# Patient Record
Sex: FEMALE | Race: WHITE | Employment: PART TIME | URBAN - METROPOLITAN AREA
[De-identification: names, ages, dates, MRNs, and addresses within clinical notes are randomized per-mention and may not be internally consistent; named-entity substitution may affect disease eponyms.]

---

## 2019-09-25 ENCOUNTER — APPOINTMENT (OUTPATIENT)
Dept: ULTRASOUND IMAGING | Facility: HOSPITAL | Age: 24
End: 2019-09-25
Attending: NURSE PRACTITIONER
Payer: COMMERCIAL

## 2019-09-25 ENCOUNTER — HOSPITAL ENCOUNTER (EMERGENCY)
Facility: HOSPITAL | Age: 24
Discharge: HOME | End: 2019-09-25
Payer: COMMERCIAL

## 2019-09-25 VITALS
TEMPERATURE: 98 F | WEIGHT: 293 LBS | BODY MASS INDEX: 41.02 KG/M2 | RESPIRATION RATE: 18 BRPM | DIASTOLIC BLOOD PRESSURE: 88 MMHG | HEART RATE: 73 BPM | OXYGEN SATURATION: 100 % | SYSTOLIC BLOOD PRESSURE: 130 MMHG | HEIGHT: 71 IN

## 2019-09-25 DIAGNOSIS — R10.11 RUQ PAIN: Primary | ICD-10-CM

## 2019-09-25 LAB
ALBUMIN SERPL-MCNC: 3.7 G/DL (ref 3.4–5)
ALBUMIN/GLOB SERPL: 0.8 {RATIO} (ref 1–2)
ALP LIVER SERPL-CCNC: 62 U/L (ref 37–98)
ALT SERPL-CCNC: 32 U/L (ref 13–56)
ANION GAP SERPL CALC-SCNC: 8 MMOL/L (ref 0–18)
AST SERPL-CCNC: 22 U/L (ref 15–37)
B-HCG UR QL: NEGATIVE
BASOPHILS # BLD AUTO: 0.02 X10(3) UL (ref 0–0.2)
BASOPHILS NFR BLD AUTO: 0.2 %
BILIRUB SERPL-MCNC: 0.3 MG/DL (ref 0.1–2)
BILIRUB UR QL: NEGATIVE
BUN BLD-MCNC: 12 MG/DL (ref 7–18)
BUN/CREAT SERPL: 12.9 (ref 10–20)
CALCIUM BLD-MCNC: 9.3 MG/DL (ref 8.5–10.1)
CHLORIDE SERPL-SCNC: 107 MMOL/L (ref 98–112)
CLARITY UR: CLEAR
CO2 SERPL-SCNC: 22 MMOL/L (ref 21–32)
COLOR UR: YELLOW
CREAT BLD-MCNC: 0.93 MG/DL (ref 0.55–1.02)
DEPRECATED RDW RBC AUTO: 40.8 FL (ref 35.1–46.3)
EOSINOPHIL # BLD AUTO: 0.13 X10(3) UL (ref 0–0.7)
EOSINOPHIL NFR BLD AUTO: 1.4 %
ERYTHROCYTE [DISTWIDTH] IN BLOOD BY AUTOMATED COUNT: 13.2 % (ref 11–15)
GLOBULIN PLAS-MCNC: 4.5 G/DL (ref 2.8–4.4)
GLUCOSE BLD-MCNC: 117 MG/DL (ref 70–99)
GLUCOSE UR-MCNC: NEGATIVE MG/DL
HCT VFR BLD AUTO: 40.4 % (ref 35–48)
HGB BLD-MCNC: 13.2 G/DL (ref 12–16)
IMM GRANULOCYTES # BLD AUTO: 0.03 X10(3) UL (ref 0–1)
IMM GRANULOCYTES NFR BLD: 0.3 %
KETONES UR-MCNC: NEGATIVE MG/DL
LIPASE SERPL-CCNC: 112 U/L (ref 73–393)
LYMPHOCYTES # BLD AUTO: 2.62 X10(3) UL (ref 1–4)
LYMPHOCYTES NFR BLD AUTO: 27.7 %
M PROTEIN MFR SERPL ELPH: 8.2 G/DL (ref 6.4–8.2)
MCH RBC QN AUTO: 27.7 PG (ref 26–34)
MCHC RBC AUTO-ENTMCNC: 32.7 G/DL (ref 31–37)
MCV RBC AUTO: 84.9 FL (ref 80–100)
MONOCYTES # BLD AUTO: 0.49 X10(3) UL (ref 0.1–1)
MONOCYTES NFR BLD AUTO: 5.2 %
NEUTROPHILS # BLD AUTO: 6.17 X10 (3) UL (ref 1.5–7.7)
NEUTROPHILS # BLD AUTO: 6.17 X10(3) UL (ref 1.5–7.7)
NEUTROPHILS NFR BLD AUTO: 65.2 %
NITRITE UR QL STRIP.AUTO: NEGATIVE
OSMOLALITY SERPL CALC.SUM OF ELEC: 285 MOSM/KG (ref 275–295)
PH UR: 6 [PH] (ref 5–8)
PLATELET # BLD AUTO: 340 10(3)UL (ref 150–450)
POTASSIUM SERPL-SCNC: 4.1 MMOL/L (ref 3.5–5.1)
PROT UR-MCNC: NEGATIVE MG/DL
RBC # BLD AUTO: 4.76 X10(6)UL (ref 3.8–5.3)
RBC #/AREA URNS AUTO: 3 /HPF
SODIUM SERPL-SCNC: 137 MMOL/L (ref 136–145)
SP GR UR STRIP: 1 (ref 1–1.03)
UROBILINOGEN UR STRIP-ACNC: <2
WBC # BLD AUTO: 9.5 X10(3) UL (ref 4–11)
WBC #/AREA URNS AUTO: 5 /HPF

## 2019-09-25 PROCEDURE — 83690 ASSAY OF LIPASE: CPT

## 2019-09-25 PROCEDURE — 36415 COLL VENOUS BLD VENIPUNCTURE: CPT

## 2019-09-25 PROCEDURE — 85025 COMPLETE CBC W/AUTO DIFF WBC: CPT

## 2019-09-25 PROCEDURE — 81001 URINALYSIS AUTO W/SCOPE: CPT

## 2019-09-25 PROCEDURE — 81025 URINE PREGNANCY TEST: CPT

## 2019-09-25 PROCEDURE — 80053 COMPREHEN METABOLIC PANEL: CPT

## 2019-09-25 PROCEDURE — 99284 EMERGENCY DEPT VISIT MOD MDM: CPT

## 2019-09-25 PROCEDURE — 76705 ECHO EXAM OF ABDOMEN: CPT | Performed by: NURSE PRACTITIONER

## 2019-09-25 RX ORDER — FAMOTIDINE 20 MG/1
20 TABLET ORAL 2 TIMES DAILY PRN
Qty: 30 TABLET | Refills: 0 | Status: SHIPPED | OUTPATIENT
Start: 2019-09-25 | End: 2019-10-25

## 2019-09-25 RX ORDER — FAMOTIDINE 20 MG/1
20 TABLET ORAL ONCE
Status: COMPLETED | OUTPATIENT
Start: 2019-09-25 | End: 2019-09-25

## 2019-09-25 NOTE — ED INITIAL ASSESSMENT (HPI)
RUQ pain x few days. Pain worse after eating and states she has been vomiting when she eats. Denies diarrhea.

## 2019-09-26 NOTE — ED NOTES
Patient given discharge instructions and prescriptions sent to pharmacy. Patient verbalized understanding. Ambulated out of ED with steady gait.

## 2019-09-26 NOTE — ED PROVIDER NOTES
Patient Seen in: [facility] AND [facility] Emergency Department      History   Patient presents with:  Abdomen/Flank Pain (GI/)    Stated Complaint: abd/back pain w vomiting    23yo/f with no chronic medical problems reports to the ED with complaints of RUQ pa pulses. Pulmonary/Chest: Effort normal and breath sounds normal.   Abdominal: Soft. Bowel sounds are normal. There is tenderness in the right upper quadrant. Musculoskeletal: Normal range of motion. She exhibits no tenderness or deformity.    Neurologic sign was not elicited. BILE DUCTS:   Normal.  Common bile duct measures 2 mm.    OTHER:   Diffusely increased echogenicity throughout the liver consistent with steatosis.                 Impression     CONCLUSION:   1. Normal gallbladder ultrasound.   2.

## 2019-09-26 NOTE — ED NOTES
Patient complaining of R upper abdominal pain. Patient has been having multiple episodes of vomiting.

## 2022-01-08 ENCOUNTER — TELEPHONE (OUTPATIENT)
Dept: OBGYN CLINIC | Facility: CLINIC | Age: 27
End: 2022-01-08

## 2022-01-13 ENCOUNTER — OFFICE VISIT (OUTPATIENT)
Dept: INTERNAL MEDICINE CLINIC | Facility: CLINIC | Age: 27
End: 2022-01-13
Payer: COMMERCIAL

## 2022-01-13 VITALS
HEART RATE: 80 BPM | RESPIRATION RATE: 16 BRPM | HEIGHT: 70.5 IN | WEIGHT: 293 LBS | DIASTOLIC BLOOD PRESSURE: 80 MMHG | BODY MASS INDEX: 41.48 KG/M2 | SYSTOLIC BLOOD PRESSURE: 130 MMHG

## 2022-01-13 DIAGNOSIS — Z51.81 ENCOUNTER FOR THERAPEUTIC DRUG MONITORING: Primary | ICD-10-CM

## 2022-01-13 DIAGNOSIS — E66.01 MORBID OBESITY WITH BMI OF 50.0-59.9, ADULT (HCC): ICD-10-CM

## 2022-01-13 DIAGNOSIS — Z13.21 ENCOUNTER FOR VITAMIN DEFICIENCY SCREENING: ICD-10-CM

## 2022-01-13 DIAGNOSIS — Z91.89 CARDIOVASCULAR RISK FACTOR: ICD-10-CM

## 2022-01-13 PROCEDURE — 3079F DIAST BP 80-89 MM HG: CPT | Performed by: NURSE PRACTITIONER

## 2022-01-13 PROCEDURE — 99204 OFFICE O/P NEW MOD 45 MIN: CPT | Performed by: NURSE PRACTITIONER

## 2022-01-13 PROCEDURE — 3008F BODY MASS INDEX DOCD: CPT | Performed by: NURSE PRACTITIONER

## 2022-01-13 PROCEDURE — 3075F SYST BP GE 130 - 139MM HG: CPT | Performed by: NURSE PRACTITIONER

## 2022-01-13 RX ORDER — ETONOGESTREL 68 MG/1
IMPLANT SUBCUTANEOUS
COMMUNITY

## 2022-01-13 RX ORDER — SEMAGLUTIDE 0.5 MG/.5ML
0.5 INJECTION, SOLUTION SUBCUTANEOUS WEEKLY
Qty: 2 ML | Refills: 0 | Status: SHIPPED | OUTPATIENT
Start: 2022-01-13

## 2022-01-13 NOTE — PATIENT INSTRUCTIONS
Welcome to the [facility]...your Lifestyle Renovation begins now! Thank you for placing your trust in our health care team, I look forward to working with you along this journey to better health!     Next steps:     1.  Sched Capsules can be left out for 2 weeks, but then must be refrigerated.      Please download kelsey MyFitness Pal or Jonel Hilliard! to monitor daily dietary intake and you will be able to see if you are eating the right amount of calories or too much or too little which

## 2022-01-13 NOTE — PROGRESS NOTES
HISTORY OF PRESENT ILLNESS  Patient presents with:  Weight Problem: ref PCP, wants surgery      Gemma Osei is a 27year old female new to our office today for initiation of medical weight loss program. Patient presents today with c/o excess weight. obesity in parent/s or sibling: yes    REVIEW OF SYSTEMS  GENERAL: feels well otherwise  SKIN: denies any rashes to skin folds  HEENT: snoring- yes  LUNGS: denies shortness of breath with exertion, no apnea  CARDIOVASCULAR: denies chest pain on exertion, d 09/25/2019     09/25/2019    K 4.1 09/25/2019     09/25/2019    CO2 22.0 09/25/2019     No results found for: EAG, A1C  No results found for: CHOLEST, TRIG, HDL, LDL, VLDL, TCHDLRATIO, NONHDLC, CHOLHDLRATIO, NONHDLC, CALCNONHDL  No results foun PANEL; Future  -     VITAMIN D; Future  -     VITAMIN B1 (THIAMINE), BLOOD; Future  -     TSH+FREE T4; Future  -     PHOSPHORUS; Future  -     MAGNESIUM;  Future  -     OP REFERRAL TO DIETITIAN [facility] ([facility] USE ONLY)  -     semaglutide-weight management (WE of our registered dieticians. Bring along your food journal (3 days minimum). See journal options below. 2.  Complete fasting (10-12 hours, water only) labs at [facility] lab site prior to next office visit.   3.  Fill your prescribed medication and take as [employer] will help to see your daily carbohydrate intake. When you set the kelsey up chose 1-2 lbs/week as a goal.  Keeping a paper food journal is an option as well to remain accountable for your choices- this is the start to mindful eating!     Continue or start exer

## 2022-01-15 ENCOUNTER — TELEPHONE (OUTPATIENT)
Dept: OBGYN CLINIC | Facility: CLINIC | Age: 27
End: 2022-01-15

## 2022-01-15 NOTE — TELEPHONE ENCOUNTER
New patient scheduled an appointment via [facility] for 2/21 requesting to have her Nexplanon removed. Please advise.

## 2022-01-29 ENCOUNTER — LAB ENCOUNTER (OUTPATIENT)
Dept: LAB | Facility: HOSPITAL | Age: 27
End: 2022-01-29
Attending: NURSE PRACTITIONER
Payer: COMMERCIAL

## 2022-01-29 DIAGNOSIS — Z13.21 ENCOUNTER FOR VITAMIN DEFICIENCY SCREENING: ICD-10-CM

## 2022-01-29 DIAGNOSIS — E66.01 MORBID OBESITY WITH BMI OF 50.0-59.9, ADULT (HCC): ICD-10-CM

## 2022-01-29 DIAGNOSIS — Z51.81 ENCOUNTER FOR THERAPEUTIC DRUG MONITORING: ICD-10-CM

## 2022-01-29 DIAGNOSIS — Z91.89 CARDIOVASCULAR RISK FACTOR: ICD-10-CM

## 2022-01-29 LAB
ALBUMIN SERPL-MCNC: 3.9 G/DL (ref 3.4–5)
ALBUMIN/GLOB SERPL: 1 {RATIO} (ref 1–2)
ALP LIVER SERPL-CCNC: 56 U/L
ALT SERPL-CCNC: 30 U/L
ANION GAP SERPL CALC-SCNC: 8 MMOL/L (ref 0–18)
AST SERPL-CCNC: 17 U/L (ref 15–37)
BASOPHILS # BLD AUTO: 0.04 X10(3) UL (ref 0–0.2)
BASOPHILS NFR BLD AUTO: 0.6 %
BILIRUB SERPL-MCNC: 0.4 MG/DL (ref 0.1–2)
BUN BLD-MCNC: 15 MG/DL (ref 7–18)
BUN/CREAT SERPL: 19.7 (ref 10–20)
CALCIUM BLD-MCNC: 9.1 MG/DL (ref 8.5–10.1)
CHLORIDE SERPL-SCNC: 104 MMOL/L (ref 98–112)
CHOLEST SERPL-MCNC: 236 MG/DL (ref ?–200)
CO2 SERPL-SCNC: 23 MMOL/L (ref 21–32)
CREAT BLD-MCNC: 0.76 MG/DL
DEPRECATED HBV CORE AB SER IA-ACNC: 83.4 NG/ML
DEPRECATED RDW RBC AUTO: 40.6 FL (ref 35.1–46.3)
EOSINOPHIL # BLD AUTO: 0.11 X10(3) UL (ref 0–0.7)
EOSINOPHIL NFR BLD AUTO: 1.5 %
ERYTHROCYTE [DISTWIDTH] IN BLOOD BY AUTOMATED COUNT: 13.1 % (ref 11–15)
EST. AVERAGE GLUCOSE BLD GHB EST-MCNC: 117 MG/DL (ref 68–126)
FASTING PATIENT LIPID ANSWER: YES
FASTING STATUS PATIENT QL REPORTED: YES
GLOBULIN PLAS-MCNC: 3.8 G/DL (ref 2.8–4.4)
GLUCOSE BLD-MCNC: 82 MG/DL (ref 70–99)
HBA1C MFR BLD: 5.7 % (ref ?–5.7)
HCT VFR BLD AUTO: 39.8 %
HDLC SERPL-MCNC: 36 MG/DL (ref 40–59)
HGB BLD-MCNC: 12.9 G/DL
IMM GRANULOCYTES NFR BLD: 0.3 %
IRON SATN MFR SERPL: 14 %
IRON SERPL-MCNC: 52 UG/DL
LDLC SERPL CALC-MCNC: 162 MG/DL (ref ?–100)
LYMPHOCYTES # BLD AUTO: 1.89 X10(3) UL (ref 1–4)
LYMPHOCYTES NFR BLD AUTO: 26.3 %
MAGNESIUM SERPL-MCNC: 1.9 MG/DL (ref 1.6–2.6)
MCH RBC QN AUTO: 27.4 PG (ref 26–34)
MCHC RBC AUTO-ENTMCNC: 32.4 G/DL (ref 31–37)
MCV RBC AUTO: 84.5 FL
MONOCYTES # BLD AUTO: 0.34 X10(3) UL (ref 0.1–1)
MONOCYTES NFR BLD AUTO: 4.7 %
NEUTROPHILS # BLD AUTO: 4.78 X10 (3) UL (ref 1.5–7.7)
NEUTROPHILS # BLD AUTO: 4.78 X10(3) UL (ref 1.5–7.7)
NEUTROPHILS NFR BLD AUTO: 66.6 %
NONHDLC SERPL-MCNC: 200 MG/DL (ref ?–130)
OSMOLALITY SERPL CALC.SUM OF ELEC: 280 MOSM/KG (ref 275–295)
PHOSPHATE SERPL-MCNC: 2.8 MG/DL (ref 2.5–4.9)
PLATELET # BLD AUTO: 295 10(3)UL (ref 150–450)
POTASSIUM SERPL-SCNC: 4.4 MMOL/L (ref 3.5–5.1)
PROT SERPL-MCNC: 7.7 G/DL (ref 6.4–8.2)
RBC # BLD AUTO: 4.71 X10(6)UL
SODIUM SERPL-SCNC: 135 MMOL/L (ref 136–145)
TIBC SERPL-MCNC: 365 UG/DL (ref 240–450)
TRANSFERRIN SERPL-MCNC: 245 MG/DL (ref 200–360)
TSI SER-ACNC: 1.98 MIU/ML (ref 0.36–3.74)
VIT B12 SERPL-MCNC: 641 PG/ML (ref 193–986)
VIT D+METAB SERPL-MCNC: 17.4 NG/ML (ref 30–100)
VLDLC SERPL CALC-MCNC: 40 MG/DL (ref 0–30)
WBC # BLD AUTO: 7.2 X10(3) UL (ref 4–11)

## 2022-01-29 PROCEDURE — 84100 ASSAY OF PHOSPHORUS: CPT

## 2022-01-29 PROCEDURE — 84425 ASSAY OF VITAMIN B-1: CPT

## 2022-01-29 PROCEDURE — 83735 ASSAY OF MAGNESIUM: CPT

## 2022-01-29 PROCEDURE — 83540 ASSAY OF IRON: CPT

## 2022-01-29 PROCEDURE — 82607 VITAMIN B-12: CPT

## 2022-01-29 PROCEDURE — 83036 HEMOGLOBIN GLYCOSYLATED A1C: CPT

## 2022-01-29 PROCEDURE — 82746 ASSAY OF FOLIC ACID SERUM: CPT

## 2022-01-29 PROCEDURE — 82306 VITAMIN D 25 HYDROXY: CPT

## 2022-01-29 PROCEDURE — 84439 ASSAY OF FREE THYROXINE: CPT

## 2022-01-29 PROCEDURE — 36415 COLL VENOUS BLD VENIPUNCTURE: CPT

## 2022-01-29 PROCEDURE — 85025 COMPLETE CBC W/AUTO DIFF WBC: CPT

## 2022-01-29 PROCEDURE — 84466 ASSAY OF TRANSFERRIN: CPT

## 2022-01-29 PROCEDURE — 80061 LIPID PANEL: CPT

## 2022-01-29 PROCEDURE — 84443 ASSAY THYROID STIM HORMONE: CPT

## 2022-01-29 PROCEDURE — 80053 COMPREHEN METABOLIC PANEL: CPT

## 2022-01-29 PROCEDURE — 82728 ASSAY OF FERRITIN: CPT

## 2022-01-31 ENCOUNTER — OFFICE VISIT (OUTPATIENT)
Dept: INTERNAL MEDICINE CLINIC | Facility: CLINIC | Age: 27
End: 2022-01-31
Payer: COMMERCIAL

## 2022-01-31 ENCOUNTER — MED REC SCAN ONLY (OUTPATIENT)
Dept: INTERNAL MEDICINE CLINIC | Facility: CLINIC | Age: 27
End: 2022-01-31

## 2022-01-31 VITALS
WEIGHT: 293 LBS | OXYGEN SATURATION: 97 % | BODY MASS INDEX: 41.48 KG/M2 | HEART RATE: 78 BPM | DIASTOLIC BLOOD PRESSURE: 70 MMHG | HEIGHT: 70.5 IN | SYSTOLIC BLOOD PRESSURE: 128 MMHG

## 2022-01-31 DIAGNOSIS — R73.02 IMPAIRED GLUCOSE TOLERANCE: ICD-10-CM

## 2022-01-31 DIAGNOSIS — E78.5 HYPERLIPIDEMIA, UNSPECIFIED HYPERLIPIDEMIA TYPE: ICD-10-CM

## 2022-01-31 DIAGNOSIS — E66.01 MORBID OBESITY WITH BMI OF 50.0-59.9, ADULT (HCC): Primary | ICD-10-CM

## 2022-01-31 PROCEDURE — 3008F BODY MASS INDEX DOCD: CPT | Performed by: FAMILY MEDICINE

## 2022-01-31 PROCEDURE — 99203 OFFICE O/P NEW LOW 30 MIN: CPT | Performed by: FAMILY MEDICINE

## 2022-01-31 PROCEDURE — 3078F DIAST BP <80 MM HG: CPT | Performed by: FAMILY MEDICINE

## 2022-01-31 PROCEDURE — 3074F SYST BP LT 130 MM HG: CPT | Performed by: FAMILY MEDICINE

## 2022-02-03 LAB — VITAMIN B1 (THIAMINE), WHOLE B: 126 NMOL/L

## 2022-07-28 ENCOUNTER — OFFICE VISIT (OUTPATIENT)
Dept: OBGYN CLINIC | Facility: CLINIC | Age: 27
End: 2022-07-28
Payer: COMMERCIAL

## 2022-07-28 VITALS
WEIGHT: 293 LBS | DIASTOLIC BLOOD PRESSURE: 82 MMHG | BODY MASS INDEX: 41.48 KG/M2 | SYSTOLIC BLOOD PRESSURE: 128 MMHG | HEIGHT: 70.5 IN

## 2022-07-28 DIAGNOSIS — Z01.419 ENCOUNTER FOR ANNUAL ROUTINE GYNECOLOGICAL EXAMINATION: Primary | ICD-10-CM

## 2022-07-28 DIAGNOSIS — Z30.46 ENCOUNTER FOR SURVEILLANCE OF IMPLANTABLE SUBDERMAL CONTRACEPTIVE: ICD-10-CM

## 2022-07-28 PROCEDURE — 99385 PREV VISIT NEW AGE 18-39: CPT | Performed by: OBSTETRICS & GYNECOLOGY

## 2022-07-28 PROCEDURE — 88175 CYTOPATH C/V AUTO FLUID REDO: CPT | Performed by: OBSTETRICS & GYNECOLOGY

## 2022-07-28 PROCEDURE — 3074F SYST BP LT 130 MM HG: CPT | Performed by: OBSTETRICS & GYNECOLOGY

## 2022-07-28 PROCEDURE — 3008F BODY MASS INDEX DOCD: CPT | Performed by: OBSTETRICS & GYNECOLOGY

## 2022-07-28 PROCEDURE — 3079F DIAST BP 80-89 MM HG: CPT | Performed by: OBSTETRICS & GYNECOLOGY

## 2022-08-04 LAB — PAP HISTORY (OTHER THAN LAST PAP): NORMAL

## 2022-08-09 ENCOUNTER — OFFICE VISIT (OUTPATIENT)
Dept: OBGYN CLINIC | Facility: CLINIC | Age: 27
End: 2022-08-09
Payer: COMMERCIAL

## 2022-08-09 VITALS
WEIGHT: 293 LBS | BODY MASS INDEX: 41.48 KG/M2 | DIASTOLIC BLOOD PRESSURE: 76 MMHG | HEIGHT: 70.5 IN | SYSTOLIC BLOOD PRESSURE: 128 MMHG

## 2022-08-09 DIAGNOSIS — Z30.017 INSERTION OF IMPLANTABLE SUBDERMAL CONTRACEPTIVE: ICD-10-CM

## 2022-08-09 DIAGNOSIS — Z30.46 ENCOUNTER FOR REMOVAL AND REINSERTION OF NEXPLANON: Primary | ICD-10-CM

## 2022-08-09 LAB
CONTROL LINE PRESENT WITH A CLEAR BACKGROUND (YES/NO): YES YES/NO
PREGNANCY TEST, URINE: NEGATIVE

## 2022-08-09 PROCEDURE — 3078F DIAST BP <80 MM HG: CPT | Performed by: OBSTETRICS & GYNECOLOGY

## 2022-08-09 PROCEDURE — 81025 URINE PREGNANCY TEST: CPT | Performed by: OBSTETRICS & GYNECOLOGY

## 2022-08-09 PROCEDURE — 11983 REMOVE/INSERT DRUG IMPLANT: CPT | Performed by: OBSTETRICS & GYNECOLOGY

## 2022-08-09 PROCEDURE — 3008F BODY MASS INDEX DOCD: CPT | Performed by: OBSTETRICS & GYNECOLOGY

## 2022-08-09 PROCEDURE — 3074F SYST BP LT 130 MM HG: CPT | Performed by: OBSTETRICS & GYNECOLOGY

## 2022-08-09 NOTE — PROCEDURES
Nexplanon Removal Procedure  [facility]    REASON FOR PROCEDURE: The patient IS  and presents for removal of Nexplanon. I reviewed the risks and procedureof Nexplanon removal including bleeding, injury, infection and difficult removal.   A procedure and Time Out were performed. A urine pregnancy test was performed in the office and is negative. The patient was prepped and draped in a sterile manner while in the supine position . The left arm was flexed at the elbow and externally rotated, prepped with betadine over the area of the palpated Nexplanon and injected with 1 cc of 1% lidocaine. A sterile drape was placed over the exposed area and sterile gloves were applied. A 3mm incision was made near the scar from the insertion site. The Nexplanon was then brought to the incision and removed with forceps. The patient was shown the removed Nexplanon. In summary, this patient underwent a Nexplanon removal today. She was instructed on the risks of the procedure and the warning signs for infection. Julia Rodriguez MD      NEXPLANON PROCEDURE NOTE  [place] [address] [facility]    Urine Pregnancy Test:  neg. REASON FOR PROCEDURE:  Patient is a  who is here for Nexplanon insertion. The patient was informed regarding indication for procedure, technique, and the risk of procedure to include bleeding and infection. INFORMED CONSENT & TIME OUT:  As documented in the Time Out and Pre-Procedure Check Lists. PROCEDURE:  The patient was placed in a supine position with the left arm flexed at the elbow and externally rotated. The arm was marked at the insertion site overlying the triceps muscle 8-10 cm from the medial epicondyle of the humerus. The insertion site was cleaned with an antiseptic. 2 mL of 1% lidocaine was injected under the skin along the planned insertion tunnel.  The Nexplanon applicator was grasped, the protection cap was removed from the applicator, and the white Nexplanon device was visualized within the applicator. The applicator needle was inserted subdermally at the removal site in the standard fashion, and the device was deployed. Placement was confirmed by palpation. The insertion site was dressed with adhesive bandage and a pressure bandage. INSTRUCTIONS GIVEN TO THE PATIENT:  Patient was told to return to the office for fever, bleeding, and pain. Patient was instructed to return for lane removal in three to five years. DISPOSITION:  Follow-up as needed.     PRIMARY PROCEDURALIST:   Estee Hernandez MD

## 2023-06-06 ENCOUNTER — PATIENT MESSAGE (OUTPATIENT)
Dept: ORTHOPEDICS CLINIC | Facility: CLINIC | Age: 28
End: 2023-06-06

## 2023-06-06 NOTE — TELEPHONE ENCOUNTER
From: Karoline Tran  To: Caitlin Paul  Sent: 6/6/2023 3:15 PM CDT  Subject: knee x-ray    Hello,     I see an order for an xray- it shows right knee- my left knee if the one that is hurting- do we need to update the order or is this an order for both knee's?

## 2023-06-07 ENCOUNTER — HOSPITAL ENCOUNTER (OUTPATIENT)
Dept: GENERAL RADIOLOGY | Facility: HOSPITAL | Age: 28
Discharge: HOME | End: 2023-06-07
Attending: PHYSICIAN ASSISTANT
Payer: COMMERCIAL

## 2023-06-07 DIAGNOSIS — Z01.89 ENCOUNTER FOR LOWER EXTREMITY COMPARISON IMAGING STUDY: ICD-10-CM

## 2023-06-07 DIAGNOSIS — M25.562 LEFT KNEE PAIN, UNSPECIFIED CHRONICITY: ICD-10-CM

## 2023-06-07 PROCEDURE — 73564 X-RAY EXAM KNEE 4 OR MORE: CPT | Performed by: PHYSICIAN ASSISTANT

## 2023-06-07 NOTE — TELEPHONE ENCOUNTER
Pt had left knee xray completed today for upcoming appt. Skipped the right comparison knee views as she was given a billing estimate of $742 despite comparison view estimate. Let us know if would like pt to return to radiology to complete comparison views. Will respond to pt. Thank you!

## 2023-09-04 ENCOUNTER — APPOINTMENT (OUTPATIENT)
Dept: CT IMAGING | Facility: HOSPITAL | Age: 28
End: 2023-09-04
Attending: NURSE PRACTITIONER
Payer: COMMERCIAL

## 2023-09-04 ENCOUNTER — HOSPITAL ENCOUNTER (EMERGENCY)
Facility: HOSPITAL | Age: 28
Discharge: HOME | End: 2023-09-04
Payer: COMMERCIAL

## 2023-09-04 VITALS
DIASTOLIC BLOOD PRESSURE: 69 MMHG | SYSTOLIC BLOOD PRESSURE: 121 MMHG | BODY MASS INDEX: 41.02 KG/M2 | WEIGHT: 293 LBS | TEMPERATURE: 98 F | HEART RATE: 61 BPM | OXYGEN SATURATION: 96 % | HEIGHT: 71 IN | RESPIRATION RATE: 18 BRPM

## 2023-09-04 DIAGNOSIS — N20.1 CALCULUS OF URETEROVESICAL JUNCTION (UVJ): Primary | ICD-10-CM

## 2023-09-04 LAB
ANION GAP SERPL CALC-SCNC: 3 MMOL/L (ref 0–18)
B-HCG UR QL: NEGATIVE
BASOPHILS # BLD AUTO: 0.03 X10(3) UL (ref 0–0.2)
BASOPHILS NFR BLD AUTO: 0.3 %
BILIRUB UR QL: NEGATIVE
BUN BLD-MCNC: 18 MG/DL (ref 7–18)
BUN/CREAT SERPL: 16.4 (ref 10–20)
CALCIUM BLD-MCNC: 9.7 MG/DL (ref 8.5–10.1)
CHLORIDE SERPL-SCNC: 110 MMOL/L (ref 98–112)
CO2 SERPL-SCNC: 26 MMOL/L (ref 21–32)
COLOR UR: YELLOW
CREAT BLD-MCNC: 1.1 MG/DL
DEPRECATED RDW RBC AUTO: 42.2 FL (ref 35.1–46.3)
EGFRCR SERPLBLD CKD-EPI 2021: 69 ML/MIN/1.73M2 (ref 60–?)
EOSINOPHIL # BLD AUTO: 0.09 X10(3) UL (ref 0–0.7)
EOSINOPHIL NFR BLD AUTO: 0.9 %
ERYTHROCYTE [DISTWIDTH] IN BLOOD BY AUTOMATED COUNT: 13.6 % (ref 11–15)
GLUCOSE BLD-MCNC: 98 MG/DL (ref 70–99)
GLUCOSE UR-MCNC: NORMAL MG/DL
HCT VFR BLD AUTO: 39.5 %
HGB BLD-MCNC: 13 G/DL
IMM GRANULOCYTES # BLD AUTO: 0.03 X10(3) UL (ref 0–1)
IMM GRANULOCYTES NFR BLD: 0.3 %
KETONES UR-MCNC: NEGATIVE MG/DL
LEUKOCYTE ESTERASE UR QL STRIP.AUTO: 250
LYMPHOCYTES # BLD AUTO: 1.79 X10(3) UL (ref 1–4)
LYMPHOCYTES NFR BLD AUTO: 18.2 %
MCH RBC QN AUTO: 27.7 PG (ref 26–34)
MCHC RBC AUTO-ENTMCNC: 32.9 G/DL (ref 31–37)
MCV RBC AUTO: 84 FL
MONOCYTES # BLD AUTO: 0.51 X10(3) UL (ref 0.1–1)
MONOCYTES NFR BLD AUTO: 5.2 %
NEUTROPHILS # BLD AUTO: 7.41 X10 (3) UL (ref 1.5–7.7)
NEUTROPHILS # BLD AUTO: 7.41 X10(3) UL (ref 1.5–7.7)
NEUTROPHILS NFR BLD AUTO: 75.1 %
NITRITE UR QL STRIP.AUTO: NEGATIVE
OSMOLALITY SERPL CALC.SUM OF ELEC: 290 MOSM/KG (ref 275–295)
PH UR: 5.5 [PH] (ref 5–8)
PLATELET # BLD AUTO: 295 10(3)UL (ref 150–450)
POTASSIUM SERPL-SCNC: 3.8 MMOL/L (ref 3.5–5.1)
PROT UR-MCNC: 30 MG/DL
RBC # BLD AUTO: 4.7 X10(6)UL
RBC #/AREA URNS AUTO: >10 /HPF
SODIUM SERPL-SCNC: 139 MMOL/L (ref 136–145)
SP GR UR STRIP: 1.03 (ref 1–1.03)
UROBILINOGEN UR STRIP-ACNC: NORMAL
WBC # BLD AUTO: 9.9 X10(3) UL (ref 4–11)

## 2023-09-04 PROCEDURE — 81025 URINE PREGNANCY TEST: CPT

## 2023-09-04 PROCEDURE — 96374 THER/PROPH/DIAG INJ IV PUSH: CPT

## 2023-09-04 PROCEDURE — 80048 BASIC METABOLIC PNL TOTAL CA: CPT

## 2023-09-04 PROCEDURE — 85025 COMPLETE CBC W/AUTO DIFF WBC: CPT

## 2023-09-04 PROCEDURE — 74176 CT ABD & PELVIS W/O CONTRAST: CPT | Performed by: NURSE PRACTITIONER

## 2023-09-04 PROCEDURE — 99284 EMERGENCY DEPT VISIT MOD MDM: CPT

## 2023-09-04 PROCEDURE — 96361 HYDRATE IV INFUSION ADD-ON: CPT

## 2023-09-04 PROCEDURE — 87086 URINE CULTURE/COLONY COUNT: CPT

## 2023-09-04 PROCEDURE — 81001 URINALYSIS AUTO W/SCOPE: CPT

## 2023-09-04 PROCEDURE — 96375 TX/PRO/DX INJ NEW DRUG ADDON: CPT

## 2023-09-04 RX ORDER — MORPHINE SULFATE 4 MG/ML
4 INJECTION, SOLUTION INTRAMUSCULAR; INTRAVENOUS ONCE
Status: COMPLETED | OUTPATIENT
Start: 2023-09-04 | End: 2023-09-04

## 2023-09-04 RX ORDER — TRAMADOL HYDROCHLORIDE 50 MG/1
TABLET ORAL EVERY 6 HOURS PRN
Qty: 10 TABLET | Refills: 0 | Status: SHIPPED | OUTPATIENT
Start: 2023-09-04 | End: 2023-09-06

## 2023-09-04 RX ORDER — ONDANSETRON 2 MG/ML
4 INJECTION INTRAMUSCULAR; INTRAVENOUS ONCE
Status: COMPLETED | OUTPATIENT
Start: 2023-09-04 | End: 2023-09-04

## 2023-09-04 RX ORDER — KETOROLAC TROMETHAMINE 15 MG/ML
15 INJECTION, SOLUTION INTRAMUSCULAR; INTRAVENOUS ONCE
Status: COMPLETED | OUTPATIENT
Start: 2023-09-04 | End: 2023-09-04

## 2023-09-04 RX ORDER — TAMSULOSIN HYDROCHLORIDE 0.4 MG/1
0.4 CAPSULE ORAL DAILY
Qty: 7 CAPSULE | Refills: 0 | Status: ON HOLD | OUTPATIENT
Start: 2023-09-04 | End: 2023-09-06

## 2023-09-04 RX ORDER — CEPHALEXIN 500 MG/1
500 CAPSULE ORAL 2 TIMES DAILY
Qty: 14 CAPSULE | Refills: 0 | Status: SHIPPED | OUTPATIENT
Start: 2023-09-04 | End: 2023-09-11

## 2023-09-04 RX ORDER — TAMSULOSIN HYDROCHLORIDE 0.4 MG/1
0.4 CAPSULE ORAL ONCE
Status: COMPLETED | OUTPATIENT
Start: 2023-09-04 | End: 2023-09-04

## 2023-09-05 ENCOUNTER — HOSPITAL ENCOUNTER (OUTPATIENT)
Facility: HOSPITAL | Age: 28
Setting detail: OBSERVATION
Discharge: HOME | End: 2023-09-06
Attending: EMERGENCY MEDICINE | Admitting: HOSPITALIST
Payer: COMMERCIAL

## 2023-09-05 DIAGNOSIS — R52 INTRACTABLE PAIN: Primary | ICD-10-CM

## 2023-09-05 DIAGNOSIS — N20.0 KIDNEY STONE: ICD-10-CM

## 2023-09-05 PROCEDURE — 99203 OFFICE O/P NEW LOW 30 MIN: CPT | Performed by: UROLOGY

## 2023-09-05 PROCEDURE — 99203 OFFICE O/P NEW LOW 30 MIN: CPT | Performed by: NURSE PRACTITIONER

## 2023-09-05 RX ORDER — MORPHINE SULFATE 4 MG/ML
4 INJECTION, SOLUTION INTRAMUSCULAR; INTRAVENOUS EVERY 2 HOUR PRN
Status: DISCONTINUED | OUTPATIENT
Start: 2023-09-05 | End: 2023-09-06

## 2023-09-05 RX ORDER — HYDROCODONE BITARTRATE AND ACETAMINOPHEN 5; 325 MG/1; MG/1
1 TABLET ORAL EVERY 4 HOURS PRN
Status: DISCONTINUED | OUTPATIENT
Start: 2023-09-05 | End: 2023-09-06

## 2023-09-05 RX ORDER — BISACODYL 10 MG
10 SUPPOSITORY, RECTAL RECTAL
Status: DISCONTINUED | OUTPATIENT
Start: 2023-09-05 | End: 2023-09-06

## 2023-09-05 RX ORDER — PROCHLORPERAZINE EDISYLATE 5 MG/ML
5 INJECTION INTRAMUSCULAR; INTRAVENOUS EVERY 8 HOURS PRN
Status: DISCONTINUED | OUTPATIENT
Start: 2023-09-05 | End: 2023-09-06

## 2023-09-05 RX ORDER — HYDROCODONE BITARTRATE AND ACETAMINOPHEN 5; 325 MG/1; MG/1
2 TABLET ORAL EVERY 4 HOURS PRN
Status: DISCONTINUED | OUTPATIENT
Start: 2023-09-05 | End: 2023-09-06

## 2023-09-05 RX ORDER — POLYETHYLENE GLYCOL 3350 17 G/17G
17 POWDER, FOR SOLUTION ORAL DAILY PRN
Status: DISCONTINUED | OUTPATIENT
Start: 2023-09-05 | End: 2023-09-06

## 2023-09-05 RX ORDER — MORPHINE SULFATE 4 MG/ML
4 INJECTION, SOLUTION INTRAMUSCULAR; INTRAVENOUS ONCE
Status: COMPLETED | OUTPATIENT
Start: 2023-09-05 | End: 2023-09-05

## 2023-09-05 RX ORDER — POTASSIUM CHLORIDE 20 MEQ/1
40 TABLET, EXTENDED RELEASE ORAL ONCE
Status: COMPLETED | OUTPATIENT
Start: 2023-09-05 | End: 2023-09-05

## 2023-09-05 RX ORDER — MORPHINE SULFATE 2 MG/ML
2 INJECTION, SOLUTION INTRAMUSCULAR; INTRAVENOUS EVERY 2 HOUR PRN
Status: DISCONTINUED | OUTPATIENT
Start: 2023-09-05 | End: 2023-09-06

## 2023-09-05 RX ORDER — ACETAMINOPHEN 325 MG/1
650 TABLET ORAL EVERY 4 HOURS PRN
Status: DISCONTINUED | OUTPATIENT
Start: 2023-09-05 | End: 2023-09-06

## 2023-09-05 RX ORDER — ONDANSETRON 2 MG/ML
4 INJECTION INTRAMUSCULAR; INTRAVENOUS EVERY 6 HOURS PRN
Status: DISCONTINUED | OUTPATIENT
Start: 2023-09-05 | End: 2023-09-06

## 2023-09-05 RX ORDER — TIRZEPATIDE 5 MG/.5ML
5 INJECTION, SOLUTION SUBCUTANEOUS
COMMUNITY

## 2023-09-05 RX ORDER — MORPHINE SULFATE 2 MG/ML
1 INJECTION, SOLUTION INTRAMUSCULAR; INTRAVENOUS EVERY 2 HOUR PRN
Status: DISCONTINUED | OUTPATIENT
Start: 2023-09-05 | End: 2023-09-06

## 2023-09-05 RX ORDER — DEXTROSE AND SODIUM CHLORIDE 5; .9 G/100ML; G/100ML
INJECTION, SOLUTION INTRAVENOUS CONTINUOUS
Status: DISCONTINUED | OUTPATIENT
Start: 2023-09-05 | End: 2023-09-06

## 2023-09-05 RX ORDER — ONDANSETRON 2 MG/ML
4 INJECTION INTRAMUSCULAR; INTRAVENOUS ONCE
Status: COMPLETED | OUTPATIENT
Start: 2023-09-05 | End: 2023-09-05

## 2023-09-05 RX ORDER — ENEMA 19; 7 G/133ML; G/133ML
1 ENEMA RECTAL ONCE AS NEEDED
Status: DISCONTINUED | OUTPATIENT
Start: 2023-09-05 | End: 2023-09-06

## 2023-09-05 RX ORDER — TAMSULOSIN HYDROCHLORIDE 0.4 MG/1
0.4 CAPSULE ORAL
Status: DISCONTINUED | OUTPATIENT
Start: 2023-09-05 | End: 2023-09-06

## 2023-09-05 RX ORDER — MULTIVIT-MIN/IRON FUM/FOLIC AC 7.5 MG-4
1 TABLET ORAL DAILY
COMMUNITY

## 2023-09-05 RX ORDER — KETOROLAC TROMETHAMINE 15 MG/ML
15 INJECTION, SOLUTION INTRAMUSCULAR; INTRAVENOUS ONCE
Status: COMPLETED | OUTPATIENT
Start: 2023-09-05 | End: 2023-09-05

## 2023-09-05 RX ORDER — SENNOSIDES 8.6 MG
17.2 TABLET ORAL NIGHTLY PRN
Status: DISCONTINUED | OUTPATIENT
Start: 2023-09-05 | End: 2023-09-06

## 2023-09-05 NOTE — PLAN OF CARE
Problem: Patient Centered Care  Goal: Patient preferences are identified and integrated in the patient's plan of care  Description: Interventions:  - What would you like us to know as we care for you? Lives at home with family  - Provide timely, complete, and accurate information to patient/family  - Incorporate patient and family knowledge, values, beliefs, and cultural backgrounds into the planning and delivery of care  - Encourage patient/family to participate in care and decision-making at the level they choose  - Honor patient and family perspectives and choices  Note: Received from ER via wheelchair. Patient alert and oriented, denies any complaint upon arrival to [facility]. Patient assisted to bed, vital signs taken, oriented to room and call light. Vital signs stable. IVF started as ordered. Will continue to monitor. BEd on low and locked position, call light within reach.

## 2023-09-05 NOTE — ED INITIAL ASSESSMENT (HPI)
Pt from home to ED via triage for evaluation of kidney stone. Pt seen and treated in this ED yesterday evening for left sided flank pain, dx with kidney stone. Pt states pain initially subsided and then came back at 3am, waking pt from sleep.

## 2023-09-05 NOTE — ED INITIAL ASSESSMENT (HPI)
Patient reports L flank pain and difficulty urinating that began earlier today. Hx of kidney stones, states pain and symptoms feel similar. Denies fever.

## 2023-09-05 NOTE — ED QUICK NOTES
Orders for admission, patient is aware of plan and ready to go upstairs. Any questions, please call ED RN Lucy Mcintyre at [address].      Patient Covid vaccination status: Fully vaccinated     COVID Test Ordered in ED: None    COVID Suspicion at Admission: N/A    Running Infusions:      Mental Status/LOC at time of transport: alert and orientated x4    Other pertinent information:   CIWA score: N/A   NIH score:  N/A

## 2023-09-06 ENCOUNTER — TELEPHONE (OUTPATIENT)
Dept: SURGERY | Facility: CLINIC | Age: 28
End: 2023-09-06

## 2023-09-06 ENCOUNTER — APPOINTMENT (OUTPATIENT)
Dept: CT IMAGING | Facility: HOSPITAL | Age: 28
End: 2023-09-06
Attending: NURSE PRACTITIONER
Payer: COMMERCIAL

## 2023-09-06 VITALS
HEART RATE: 62 BPM | DIASTOLIC BLOOD PRESSURE: 65 MMHG | SYSTOLIC BLOOD PRESSURE: 124 MMHG | OXYGEN SATURATION: 97 % | TEMPERATURE: 98 F | WEIGHT: 293 LBS | BODY MASS INDEX: 41.02 KG/M2 | RESPIRATION RATE: 20 BRPM | HEIGHT: 71 IN

## 2023-09-06 LAB
ANION GAP SERPL CALC-SCNC: 3 MMOL/L (ref 0–18)
BASOPHILS # BLD AUTO: 0.02 X10(3) UL (ref 0–0.2)
BASOPHILS NFR BLD AUTO: 0.4 %
BUN BLD-MCNC: 12 MG/DL (ref 7–18)
BUN/CREAT SERPL: 12.4 (ref 10–20)
CALCIUM BLD-MCNC: 8.8 MG/DL (ref 8.5–10.1)
CHLORIDE SERPL-SCNC: 114 MMOL/L (ref 98–112)
CO2 SERPL-SCNC: 24 MMOL/L (ref 21–32)
CREAT BLD-MCNC: 0.97 MG/DL
DEPRECATED RDW RBC AUTO: 43 FL (ref 35.1–46.3)
EGFRCR SERPLBLD CKD-EPI 2021: 81 ML/MIN/1.73M2 (ref 60–?)
EOSINOPHIL # BLD AUTO: 0.1 X10(3) UL (ref 0–0.7)
EOSINOPHIL NFR BLD AUTO: 2.1 %
ERYTHROCYTE [DISTWIDTH] IN BLOOD BY AUTOMATED COUNT: 13.6 % (ref 11–15)
GLUCOSE BLD-MCNC: 90 MG/DL (ref 70–99)
HCT VFR BLD AUTO: 36.9 %
HGB BLD-MCNC: 11.7 G/DL
IMM GRANULOCYTES # BLD AUTO: 0 X10(3) UL (ref 0–1)
IMM GRANULOCYTES NFR BLD: 0 %
LYMPHOCYTES # BLD AUTO: 1.64 X10(3) UL (ref 1–4)
LYMPHOCYTES NFR BLD AUTO: 35.1 %
MCH RBC QN AUTO: 27.1 PG (ref 26–34)
MCHC RBC AUTO-ENTMCNC: 31.7 G/DL (ref 31–37)
MCV RBC AUTO: 85.6 FL
MONOCYTES # BLD AUTO: 0.32 X10(3) UL (ref 0.1–1)
MONOCYTES NFR BLD AUTO: 6.9 %
NEUTROPHILS # BLD AUTO: 2.59 X10 (3) UL (ref 1.5–7.7)
NEUTROPHILS # BLD AUTO: 2.59 X10(3) UL (ref 1.5–7.7)
NEUTROPHILS NFR BLD AUTO: 55.5 %
OSMOLALITY SERPL CALC.SUM OF ELEC: 291 MOSM/KG (ref 275–295)
PLATELET # BLD AUTO: 233 10(3)UL (ref 150–450)
POTASSIUM SERPL-SCNC: 4.2 MMOL/L (ref 3.5–5.1)
POTASSIUM SERPL-SCNC: 4.2 MMOL/L (ref 3.5–5.1)
RBC # BLD AUTO: 4.31 X10(6)UL
SODIUM SERPL-SCNC: 141 MMOL/L (ref 136–145)
WBC # BLD AUTO: 4.7 X10(3) UL (ref 4–11)

## 2023-09-06 PROCEDURE — 99212 OFFICE O/P EST SF 10 MIN: CPT | Performed by: UROLOGY

## 2023-09-06 RX ORDER — TAMSULOSIN HYDROCHLORIDE 0.4 MG/1
0.4 CAPSULE ORAL DAILY
Qty: 30 CAPSULE | Refills: 0 | Status: SHIPPED | OUTPATIENT
Start: 2023-09-06 | End: 2023-10-06

## 2023-09-06 NOTE — CM/SW NOTE
09/06/23 1200   Discharge disposition   Expected discharge disposition Home or Self   Discharge transportation Private car     Evans DANN RN Srikanth Philip RN Case Manager  [phone]

## 2023-09-06 NOTE — PROGRESS NOTES
Tmax 97.9, heart rate 62, blood pressure 124/65, respiratory rate 20, SPO2 97%    White blood cell count 4.7 from 9.9. Urine culture negative. Patient feels well. CONSTITUTIONAL: No apparent distress, cooperative and communicative  NEUROLOGIC: Alert and oriented   HEAD: Normocephalic, atraumatic   EYES: Sclera non-icteric   ENT: Hearing intact, moist mucous membranes   NECK: No obvious goiter or masses   RESPIRATORY: Normal respiratory effort, Nonlabored breathing on room air  SKIN: No evident rashes   ABDOMEN: Soft, nontender, nondistended, no rebound tenderness, no guarding, no masses     IMPRESSION: 2mm left UVJ stone without any signs or symptoms to warrant urgent intervention. Patient will pursue medical expulsive therapy. She will return to clinic in 4 weeks for stone analysis/possible ordering of CT scan if she has not passed the stone. She also has a 7 mm right interpolar stone that we can discuss treatment for. Plan:  1. Tamsulosin 0.4 mg nightly  2. Hydration  3. Return to clinic in 4 weeks for stone analysis/possible ordering of CT if she has not passed the stone and discussion of right ureteroscopy  4.  [id number] Wendy Haney for [employer] with tamsulosin

## 2023-09-06 NOTE — TELEPHONE ENCOUNTER
-LMTCB on  to schedule 4-week post-op during week of 10/2/23 on Mon-Wed-Fri.  -Outcome pending pt call-back.

## 2023-09-11 NOTE — TELEPHONE ENCOUNTER
Spoke with patient, unsure why she was on  schedule. Assisted in rescheduling with Rip Saunders. She confirmed and verbalized understanding.

## 2023-10-07 ENCOUNTER — HOSPITAL ENCOUNTER (OUTPATIENT)
Dept: CT IMAGING | Facility: HOSPITAL | Age: 28
Discharge: HOME | End: 2023-10-07
Attending: NURSE PRACTITIONER
Payer: COMMERCIAL

## 2023-10-07 PROCEDURE — 74176 CT ABD & PELVIS W/O CONTRAST: CPT | Performed by: NURSE PRACTITIONER

## 2024-11-18 ENCOUNTER — HOSPITAL ENCOUNTER (OUTPATIENT)
Age: 29
Discharge: HOME | End: 2024-11-18
Payer: COMMERCIAL

## 2024-11-18 VITALS
TEMPERATURE: 99 F | RESPIRATION RATE: 18 BRPM | HEART RATE: 62 BPM | DIASTOLIC BLOOD PRESSURE: 77 MMHG | OXYGEN SATURATION: 99 % | SYSTOLIC BLOOD PRESSURE: 132 MMHG

## 2024-11-18 DIAGNOSIS — J06.9 VIRAL UPPER RESPIRATORY TRACT INFECTION WITH COUGH: Primary | ICD-10-CM

## 2024-11-18 PROCEDURE — 99213 OFFICE O/P EST LOW 20 MIN: CPT | Performed by: NURSE PRACTITIONER

## 2024-11-18 RX ORDER — BENZONATATE 100 MG/1
100 CAPSULE ORAL 3 TIMES DAILY PRN
Qty: 30 CAPSULE | Refills: 0 | Status: SHIPPED | OUTPATIENT
Start: 2024-11-18 | End: 2024-12-18

## 2024-11-18 RX ORDER — ALBUTEROL SULFATE 90 UG/1
2 INHALANT RESPIRATORY (INHALATION) EVERY 6 HOURS PRN
Qty: 1 EACH | Refills: 0 | Status: SHIPPED | OUTPATIENT
Start: 2024-11-18 | End: 2024-12-18

## 2024-11-18 NOTE — DISCHARGE INSTRUCTIONS
Viral infections are usually the worst days 3-5, but can last up to 14 days.  You may develop or continue to cough for up to 3 weeks after.  Viral infections do not improve with the use of antibiotics.  Grainger diet, increase water intake; gatorade or pedialyte   Runny Nose/Congestion:  Humidifier at bedside   Vicks vaporub under nose and chest wall  - Nasal Rinse (Guttenberg Pot)  - Flonase  - Antihistamine (Allegra, Zyrtec, Claritin)  - Nasal Decongestant (Sudafed); if you have high blood pressure max use 2 days  Cough:  - Mucinex OR Robitussin  Tessalon perles three times a day  Albuterol inhaler 2 puffs every 6 hours for cough, bronchospasm.  - Warm tea w/honey  - Humidifier in bedroom at night  Sore Throat:  - Salt water gargle  - Ibuprofen every 6-8 hours as needed for pain  Fever:  Tylenol or Motrin every 6 hours for fever > 100.4. You can alternate between the two as well if fever high.  Follow up with your primary care provider in the next 2-3 days.  Go to the emergency room with:  - Fever > 102 that does not respond to medications.  - Shortness of breath, difficulty breathing.  - Chest pain.  - Vomiting and unable to keep down fluids or medications

## 2024-11-18 NOTE — ED PROVIDER NOTES
Patient Seen in: [facility]      History     Chief Complaint   Patient presents with    Cough/URI     Stated Complaint: Cough    Subjective:   HPI    29-year-old female here for evaluation of chest congestion and dry cough that started Saturday.  She reports some chest pressure with coughing.  She denies fevers or chills, shortness of breath, chest pain, nausea vomiting, diarrhea, headache or dizziness.  She has been using cough drops for symptoms only.  She has minimal sore throat but denies ear pain.  Son is sick with chest congestion and fever.    Objective:     No pertinent past medical history.            No pertinent past surgical history.              No pertinent social history.            Review of Systems    Positive for stated complaint: Cough  Other systems are as noted in HPI.  Constitutional and vital signs reviewed.      All other systems reviewed and negative except as noted above.    Physical Exam     ED Triage Vitals [11/18/24 1639]   /77   Pulse 62   Resp 18   Temp 98.8 °F (37.1 °C)   Temp src Oral   SpO2 99 %   O2 Device None (Room air)       Current Vitals:   Vital Signs  BP: 132/77  Pulse: 62  Resp: 18  Temp: 98.8 °F (37.1 °C)  Temp src: Oral    Oxygen Therapy  SpO2: 99 %  O2 Device: None (Room air)        Physical Exam  Vitals and nursing note reviewed.   Constitutional:       General: She is not in acute distress.     Appearance: Normal appearance. She is not ill-appearing, toxic-appearing or diaphoretic.   HENT:      Head: Normocephalic and atraumatic.      Right Ear: Tympanic membrane, ear canal and external ear normal.      Left Ear: Tympanic membrane, ear canal and external ear normal.      Nose: Nose normal.      Mouth/Throat:      Mouth: Mucous membranes are moist.      Pharynx: Oropharynx is clear. No oropharyngeal exudate or posterior oropharyngeal erythema.   Eyes:      Pupils: Pupils are equal, round, and reactive to light.   Cardiovascular:      Rate and  Rhythm: Normal rate.      Pulses: Normal pulses.   Pulmonary:      Effort: Pulmonary effort is normal. No respiratory distress.      Breath sounds: Normal breath sounds. No stridor. No wheezing, rhonchi or rales.   Musculoskeletal:         General: Normal range of motion.      Cervical back: Normal range of motion and neck supple. No tenderness.   Lymphadenopathy:      Cervical: No cervical adenopathy.   Skin:     General: Skin is warm.      Findings: No rash.   Neurological:      Mental Status: She is alert and oriented to person, place, and time.   Psychiatric:         Mood and Affect: Mood normal.         Behavior: Behavior normal.           ED Course   Labs Reviewed - No data to display              MDM     29-year-old female here for evaluation of cough chest congestion that started Saturday.  Denies fevers shortness of breath chest pain, headache or dizziness, nausea vomiting diarrhea.    On exam patient well-appearing lungs are clear with no wheezing stridor or crackles, good air movement.  99% on room air.  Bilateral TM normal, pharynx clear with no erythema or swelling.    Differential diagnoses reflecting the complexity of care include but are not limited to URI with cough, pneumonia, COVID, flu.    Comorbidities that add complexity to management include: None  History obtained by an independent source was from: Patient  My independent interpretations of studies include: None performed  Shared decision making was done by: Patient myself  Discussions of management was done with: Patient    Patient is well appearing, non-toxic and in no acute distress.  Vital signs are stable.   Patient is not having any fatigue or shortness of breath she denies fevers.  Her lungs are clear and her oxygenation is good.  Less likely pneumonia at this time.    Discussed URI with cough.  Advised on Tessalon Perles for cough, albuterol for cough and congestion, bronchospasms.    Patient agrees to plan of care stable for  discharge home  All questions answered. Return and ER precautions given.    Counseled: Patient, regarding diagnosis, regarding treatment plan, regarding diagnostic results, regarding prescription, I have discussed with the patient the results of tests, differential diagnosis, and warning signs and symptoms that should prompt immediate return. The patient understands these instructions and agrees to the follow-up plan provided. There is no barriers to learning. Appropriate f/u given. Patient agrees to return for any concerns/ problems/complications.            Medical Decision Making      Disposition and Plan     Clinical Impression:  1. Viral upper respiratory tract infection with cough         Disposition:  Discharge  11/18/2024  5:49 pm    Follow-up:  Rosalino Wynne  [address] [address]  [phone]    Schedule an appointment as soon as possible for a visit in 1 week  If symptoms worsen          Medications Prescribed:  Current Discharge Medication List        START taking these medications    Details   benzonatate 100 MG Oral Cap Take 1 capsule (100 mg total) by mouth 3 (three) times daily as needed for cough.  Qty: 30 capsule, Refills: 0      albuterol 108 (90 Base) MCG/ACT Inhalation Aero Soln Inhale 2 puffs into the lungs every 6 (six) hours as needed (cough, bronchospasm, shortness of breath).  Qty: 1 each, Refills: 0                 Supplementary Documentation:

## 2025-01-27 ENCOUNTER — OFFICE VISIT (OUTPATIENT)
Dept: SURGERY | Facility: CLINIC | Age: 30
End: 2025-01-27
Payer: COMMERCIAL

## 2025-01-27 VITALS
DIASTOLIC BLOOD PRESSURE: 84 MMHG | SYSTOLIC BLOOD PRESSURE: 120 MMHG | OXYGEN SATURATION: 99 % | WEIGHT: 290.81 LBS | HEART RATE: 68 BPM | BODY MASS INDEX: 41.17 KG/M2 | HEIGHT: 70.3 IN

## 2025-01-27 DIAGNOSIS — E55.9 VITAMIN D DEFICIENCY: ICD-10-CM

## 2025-01-27 DIAGNOSIS — E66.01 OBESITY, MORBID, BMI 40.0-49.9 (HCC): ICD-10-CM

## 2025-01-27 DIAGNOSIS — R73.03 PREDIABETES: ICD-10-CM

## 2025-01-27 DIAGNOSIS — E78.2 MIXED HYPERLIPIDEMIA: Primary | ICD-10-CM

## 2025-01-27 PROCEDURE — 99204 OFFICE O/P NEW MOD 45 MIN: CPT | Performed by: INTERNAL MEDICINE

## 2025-01-27 RX ORDER — TIRZEPATIDE 10 MG/.5ML
10 INJECTION, SOLUTION SUBCUTANEOUS WEEKLY
Qty: 2 ML | Refills: 0 | Status: SHIPPED | OUTPATIENT
Start: 2025-01-27 | End: 2025-02-18

## 2025-01-27 NOTE — PATIENT INSTRUCTIONS
Please try to work on the following dietary changes:  Goals: Aim for 20-30 grams of protein/ meal  Aim for <100 grams of carbohydrates/day  Eat 4-6 vegetables/day  Avoid skipping meals- eat every 4-5 hours  Aim for 3 meals/day  2. Drink lots of water and cut down on soda/juice consumption if soda/juice drinker  3. Focus on protein: (15-30 grams with each meal) ie. greek yogurt, cottage cheese, string cheese, hard boiled eggs  4. Healthy snacks: always have protein in your snack! peanut butter and apples, hummus and carrots, berries, nuts (1/4 cup), tuna and crackers                 Protein Shakes: Premier protein or Core Power                Protein Bars: Rx Bars, Oatmega, Power Crunch                 [place] balanced breaks (cheese and nuts)- without chocolate  5. Reduce carbohydrates <100 grams which includes sweets as well as rice, pasta, potatoes, bread, corn and instead choose whole grain options or more protein or vegetables (4-6 servings of vegetables per day). Use Trippeo kelsey for carb counting!  6. Get a good night of sleep  7. Try to decrease stress in life; meditate at least 10 minutes before sleeping! Try it and let me know what works for you, try youMedlioube or apps like Calm and Ayalogic!     Please download apps:  1. \"My Fitness Pal\" (other option is Lose it)) to help you to monitor daily dietary intake and you will be able to see if you are eating the right amount of calories, protein, carbs                With My Fitness Pal-->When you set-up the kelsey or need to adjust settings:                Goals should include:                 Lose 1.5-2 lbs per week                Activity level: not very active (can't count exercise towards calorie number per day)                   ** Daily INPUT> Look at nutrition section-- \"nutrients\" and it will break down your macros for the day (ie. Protein, carbs, fibers, sugars and fats). Try to stay within these numbers daily     2. \"7 minute workout\" to help with  exercise/activity which takes 7 minutes of your day and that you can do at home!   3. \"Calm\" or \"Headspace\" which helps with mindfulness, meditation, clarity, sleep, and jennifer to your daily life.   4. Skinnytaste blog for healthy recipe ideas  5. [employer] for low carb resources     HIGH PROTEIN SNACK IDEAS  -cottage cheese  -plain yogurt  -kefir  -hard-boiled eggs  -natural cheeses  -nuts (measure portion size)   -unsweetened nut butters  -dried edamame   -kenyatta seeds soaked in water or almond milk  -soy nuts  -cured meats (monitor for sodium issues)   -hummus with vegetables  -bean dip with vegetables     FRUIT  Low carb fruit options   Raspberries: Half a cup (60 grams) contains 3 grams of carbs.  Blackberries: Half a cup (70 grams) contains 4 grams of carbs.  Strawberries: Half a cup (100 grams) contains 6 grams of carbs.  Blueberries: Half a cup (50 grams) contains 6 grams of carbs.  Plum: One medium-sized (80 grams) contains 6 grams of carbs.     VEGETABLES  Low carb vegetables           Understanding Carbohydrates  Goal <100g  A car needs the right type of fuel to run. And you need the right kind of food to function. To keep your energy level up, your body needs food that has carbohydrates (carbs). But carbs raise blood sugar levels higher and faster than other kinds of food. Your dietitian will work with you to figure out the amount of carbs youneed. Carbs come in 3 types: starches, sugars, and fiber.   Starches  Starches are found in grains, some vegetables, and beans. Grain products include bread, pasta, cereal, and tortillas. Starchy vegetables include potatoes, peas, corn, lima beans, yams, and squash. Kidney beans, glass beans,black beans, garbanzo beans, and lentils also have starches.     Sugars  Sugars are found naturally in many foods. Or they can be added. Foods that contain natural sugar include fruits and fruit juices, dairy products, honey, and molasses. Added sugars are found in most  desserts, processed foods, candy, regular soda, and fruit drinks. These are very helpful to treat low blood sugar (hypoglycemia). They give you sugar quickly. Try to keep at least 15 to 20 grams of these simple sugars with you at all times. Eat or drink these if you start to havesymptoms of low blood sugar.   Fiber  Fiber comes from plant foods. Your body can't digest most fiber. Instead of raising blood sugar levels like other carbs, fiber stops blood sugar from rising too fast. Fiber is found in fruits, vegetables, whole grains, beans,peas, and many nuts.   Carb counting  Keep track of the amount of carbs you eat. This can help you keep the right balance of carbs, physical activity, and medicine. The amount of carbs you need will be different from what other people need. How much you need depends on many things. These include your health, the medicines you take, and how active you are. Your healthcare team will help you figure out the right amount of carbs for you. You may start with 45 to 60 grams of carbs per meal, depending on your case. Carb counting is a system that helps you keep track of thecarbohydrates you eat at each meal.   Carbs come from many foods. These include grains, starchy vegetables, fruit, milk, beans, and snack foods. You can either count carbohydrate grams or carbohydrate servings. When you count carbohydrate servings, 1 carbohydrateserving = 15 grams of carbohydrates.   Here are some examples of foods that have about 15 grams of carbs (1 serving of carbohydrates):   1/2 cup of canned or frozen fruit  A small piece of fresh fruit (4 ounces)  1 slice of bread  1/2 cup of oatmeal  1/3 cup of rice  4 to 6 crackers  1/2 English muffin  1/2 cup of black beans  1/4 of a large baked potato (3 ounces)  2/3 cup of plain fat-free yogurt  1 cup of soup  1/2 cup of casserole  6 chicken nuggets  2-inch-square brownie or cake without frosting  2 small cookies  1/2 cup of ice cream or sherbet  Carb  counting is easier when food labels are available. Look at the label to see how many grams of total carbs per serving the food contains. Then you can figure out how much you should eat. If your food doesn't have a nutrition label, you should be able to get an idea how many carbs there are per servingby using a book or website.   Two very important lines to look at on the label are the serving size and the total carbohydrate amount per serving. Here are some tips for using food labelsto count your carbs:   Check the serving size. The information on the label is based on that serving size. If you eat more than the listed serving size, you may have to double or triple the other information on the label.   Check the total grams of carbs.  Total carbohydrate from the label includes sugar, starch, and fiber. Be sure to use the total carbohydrate number (minus the fiber) and not sugar alone.  Know how many grams of carbs you can have.  Be familiar with the matching portion sizes.  Compare labels. Compare the labels of different products. Look at serving sizes and total carbs to find the products that work best for you.   Don't forget protein and fat. With the focus on carb counting, it might be easy to forget protein and fat in your meals. Don't forget to include sources of protein and healthy fat to balance your meals. Also watch how much salt (sodium) you eat. This is especially true if you have high blood pressure. If you have diabetes, limit the amount of sodium to less than 2,300 mg a day.    It’s also important to be consistent with the amount of carbs and time you eat when taking a fixed dose of diabetes medicine. Work with your healthcare provider or dietitian if you need more help. They can help you keep track of your carbs. They can also help you figure out how many grams of carbs youshould have.

## 2025-01-27 NOTE — PROGRESS NOTES
CC:   Chief Complaint   Patient presents with    Consult    Weight Management        Referring Physician to whom this note will be reported back to: AJAY FLOWER   Reason for medical consultation: Medical Management of Weight and Weight related comorbidities.      HPI:   -  lbs 11/2023   -tried GLP1 wegovy 2.4 mg, some nausea and manageable  - LW  282 lbs 7/2024  -Tried phentermine, caused elevation in BP, stopped    Body mass index is 41.37 kg/m².   Wt Readings from Last 6 Encounters:   01/27/25 290 lb 12.8 oz (131.9 kg)   09/05/23 (!) 317 lb 1.6 oz (143.8 kg)   09/04/23 (!) 321 lb (145.6 kg)   08/09/22 (!) 352 lb 12.8 oz (160 kg)   07/28/22 (!) 343 lb (155.6 kg)   01/31/22 (!) 355 lb 12.8 oz (161.4 kg)     /84   Pulse 68   Ht 5' 10.3\" (1.786 m)   Wt 290 lb 12.8 oz (131.9 kg)   SpO2 99%   BMI 41.37 kg/m²   Vitals:    01/27/25 1234   BP: 120/84   Pulse: 68     Body mass index is 41.37 kg/m².  Waist Circumference: 47.5 inches       Typical Dietary Intake:  Breakfast AM Snack Lunch PM Snack Dinner   Bagel with egg skip salad skip chicken     Soda Drinker?: No   Number of restaurant or fast food meals/week:  0 meals/week    LABS and RESULTS:     No visits with results within 6 Month(s) from this visit.   Latest known visit with results is:   Admission on 09/05/2023, Discharged on 09/06/2023   Component Date Value    Glucose 09/06/2023 90     Sodium 09/06/2023 141     Potassium 09/06/2023 4.2     Chloride 09/06/2023 114 (H)     CO2 09/06/2023 24.0     Anion Gap 09/06/2023 3     BUN 09/06/2023 12     Creatinine 09/06/2023 0.97     BUN/CREA Ratio 09/06/2023 12.4     Calcium, Total 09/06/2023 8.8     Calculated Osmolality 09/06/2023 291     eGFR-Cr 09/06/2023 81     Potassium 09/06/2023 4.2     WBC 09/06/2023 4.7     RBC 09/06/2023 4.31     HGB 09/06/2023 11.7 (L)     HCT 09/06/2023 36.9     MCV 09/06/2023 85.6     MCH 09/06/2023 27.1     MCHC 09/06/2023 31.7     RDW-SD 09/06/2023 43.0     RDW 09/06/2023  13.6     PLT 2023 233.0     Neutrophil Absolute Prel* 2023 2.59     Neutrophil Absolute 2023 2.59     Lymphocyte Absolute 2023 1.64     Monocyte Absolute 2023 0.32     Eosinophil Absolute 2023 0.10     Basophil Absolute 2023 0.02     Immature Granulocyte Abs* 2023 0.00     Neutrophil % 2023 55.5     Lymphocyte % 2023 35.1     Monocyte % 2023 6.9     Eosinophil % 2023 2.1     Basophil % 2023 0.4     Immature Granulocyte % 2023 0.0        Current Outpatient Medications   Medication Sig Dispense Refill    Tirzepatide-Weight Management (ZEPBOUND) 10 MG/0.5ML Subcutaneous Solution Auto-injector Inject 10 mg into the skin once a week for 4 doses. 2 mL 0      Past Medical History:    Amenorrhea    need to be replaced again    Kidney stone    Preeclampsia, third trimester (HCC)       Past Surgical History:   Procedure Laterality Date    Appendectomy  2016     delivery only  2015    Tonsillectomy  2012       Social History:  Social History     Socioeconomic History    Marital status: Single   Tobacco Use    Smoking status: Never    Smokeless tobacco: Never   Vaping Use    Vaping status: Never Used   Substance and Sexual Activity    Alcohol use: Never    Drug use: Never     Family History:  Family History   Problem Relation Age of Onset    Hypertension Mother     Heart Disease Father         40s          REVIEW OF SYSTEMS:   10 point review of systems otherwise negative.  With the exception of HPI and assessment and plan        EXAM:     GENERAL: NAD, conversant  SKIN: good turgor, no jaundice  HEENT: nl external nose and ears  NECK: supple  LUNGS: nl effort, clear to auscultation bilaterally  CARDIO: RRR, no murmur  ABDOMEN: NT/ND, no masses  EXTREMITIES: gait normal, no edema   PSYCH: Oriented times three, appropriate affect    ASSESSMENT AND PLAN:     1. Prediabetes  2. Mixed hyperlipidemia  3. Vitamin D deficiency  4. Obesity,  morbid, BMI 40.0-49.9 ([facility])    - Initial weight 290 lb 12.8 oz (131.9 kg), Initial Body mass index is 41.37 kg/m².  - The patient participated in a comprehensive weight management program that encourages behavioral modification, reduced calorie diet and increased physical activity with continuing follow up for at least 6 months prior to using drug therapy.     Plan:  - CBC With Differential With Platelet; Future  - Comp Metabolic Panel (14); Future  - TSH W Reflex To Free T4; Future  - Lipid Panel; Future  - Tirzepatide-Weight Management (ZEPBOUND) 10 MG/0.5ML Subcutaneous Solution Auto-injector; Inject 10 mg into the skin once a week for 4 doses.  Dispense: 2 mL; Refill: 0  - Hemoglobin A1C [E]; Future  - Vitamin D; Future    Regarding Obesity:  Follow up with dietitian and psychologist as recommended.  Discussed the role of sleep and stress in weight management.  Labs orders as above.  Counseled on comprehensive weight loss plan including attention to nutrition, exercise and behavior/stress management for success. See patient instruction below for more details.  Weight Loss Consent to treat reviewed and signed.    Regarding weight loss Medications.  Medication use and side effects reviewed with patient.    Medication will be used with a reduced calorie diet and increased physical activity in the management of exogenous obesity.  Patient will be responsible to let me know of any side effects or complications with medications.             Return in about 4 weeks (around 2/24/2025).     Bianca aLyton MD

## 2025-02-01 ENCOUNTER — LAB ENCOUNTER (OUTPATIENT)
Dept: LAB | Facility: HOSPITAL | Age: 30
End: 2025-02-01
Attending: INTERNAL MEDICINE
Payer: COMMERCIAL

## 2025-02-01 DIAGNOSIS — E66.01 OBESITY, MORBID, BMI 40.0-49.9 (HCC): ICD-10-CM

## 2025-02-01 DIAGNOSIS — R73.03 PREDIABETES: ICD-10-CM

## 2025-02-01 DIAGNOSIS — E78.2 MIXED HYPERLIPIDEMIA: ICD-10-CM

## 2025-02-01 DIAGNOSIS — E55.9 VITAMIN D DEFICIENCY: ICD-10-CM

## 2025-02-01 LAB
ALBUMIN SERPL-MCNC: 4.7 G/DL (ref 3.2–4.8)
ALBUMIN/GLOB SERPL: 1.6 {RATIO} (ref 1–2)
ALP LIVER SERPL-CCNC: 47 U/L
ALT SERPL-CCNC: 10 U/L
ANION GAP SERPL CALC-SCNC: 8 MMOL/L (ref 0–18)
AST SERPL-CCNC: 13 U/L (ref ?–34)
BASOPHILS # BLD AUTO: 0.03 X10(3) UL (ref 0–0.2)
BASOPHILS NFR BLD AUTO: 0.6 %
BILIRUB SERPL-MCNC: 0.6 MG/DL (ref 0.3–1.2)
BUN BLD-MCNC: 13 MG/DL (ref 9–23)
BUN/CREAT SERPL: 16.3 (ref 10–20)
CALCIUM BLD-MCNC: 9.2 MG/DL (ref 8.7–10.4)
CHLORIDE SERPL-SCNC: 108 MMOL/L (ref 98–112)
CHOLEST SERPL-MCNC: 203 MG/DL (ref ?–200)
CO2 SERPL-SCNC: 24 MMOL/L (ref 21–32)
CREAT BLD-MCNC: 0.8 MG/DL
DEPRECATED RDW RBC AUTO: 40.1 FL (ref 35.1–46.3)
EGFRCR SERPLBLD CKD-EPI 2021: 101 ML/MIN/1.73M2 (ref 60–?)
EOSINOPHIL # BLD AUTO: 0.06 X10(3) UL (ref 0–0.7)
EOSINOPHIL NFR BLD AUTO: 1.2 %
ERYTHROCYTE [DISTWIDTH] IN BLOOD BY AUTOMATED COUNT: 12.9 % (ref 11–15)
EST. AVERAGE GLUCOSE BLD GHB EST-MCNC: 105 MG/DL (ref 68–126)
FASTING PATIENT LIPID ANSWER: YES
FASTING STATUS PATIENT QL REPORTED: YES
GLOBULIN PLAS-MCNC: 3 G/DL (ref 2–3.5)
GLUCOSE BLD-MCNC: 81 MG/DL (ref 70–99)
HBA1C MFR BLD: 5.3 % (ref ?–5.7)
HCT VFR BLD AUTO: 38.8 %
HDLC SERPL-MCNC: 43 MG/DL (ref 40–59)
HGB BLD-MCNC: 12.7 G/DL
IMM GRANULOCYTES # BLD AUTO: 0.01 X10(3) UL (ref 0–1)
IMM GRANULOCYTES NFR BLD: 0.2 %
LDLC SERPL CALC-MCNC: 142 MG/DL (ref ?–100)
LYMPHOCYTES # BLD AUTO: 1.47 X10(3) UL (ref 1–4)
LYMPHOCYTES NFR BLD AUTO: 28.7 %
MCH RBC QN AUTO: 28.2 PG (ref 26–34)
MCHC RBC AUTO-ENTMCNC: 32.7 G/DL (ref 31–37)
MCV RBC AUTO: 86 FL
MONOCYTES # BLD AUTO: 0.3 X10(3) UL (ref 0.1–1)
MONOCYTES NFR BLD AUTO: 5.9 %
NEUTROPHILS # BLD AUTO: 3.25 X10 (3) UL (ref 1.5–7.7)
NEUTROPHILS # BLD AUTO: 3.25 X10(3) UL (ref 1.5–7.7)
NEUTROPHILS NFR BLD AUTO: 63.4 %
NONHDLC SERPL-MCNC: 160 MG/DL (ref ?–130)
OSMOLALITY SERPL CALC.SUM OF ELEC: 289 MOSM/KG (ref 275–295)
PLATELET # BLD AUTO: 250 10(3)UL (ref 150–450)
POTASSIUM SERPL-SCNC: 4.1 MMOL/L (ref 3.5–5.1)
PROT SERPL-MCNC: 7.7 G/DL (ref 5.7–8.2)
RBC # BLD AUTO: 4.51 X10(6)UL
SODIUM SERPL-SCNC: 140 MMOL/L (ref 136–145)
TRIGL SERPL-MCNC: 100 MG/DL (ref 30–149)
TSI SER-ACNC: 2.68 UIU/ML (ref 0.55–4.78)
VIT D+METAB SERPL-MCNC: 21.6 NG/ML (ref 30–100)
VLDLC SERPL CALC-MCNC: 19 MG/DL (ref 0–30)
WBC # BLD AUTO: 5.1 X10(3) UL (ref 4–11)

## 2025-02-01 PROCEDURE — 36415 COLL VENOUS BLD VENIPUNCTURE: CPT

## 2025-02-01 PROCEDURE — 83036 HEMOGLOBIN GLYCOSYLATED A1C: CPT

## 2025-02-01 PROCEDURE — 80061 LIPID PANEL: CPT

## 2025-02-01 PROCEDURE — 85025 COMPLETE CBC W/AUTO DIFF WBC: CPT

## 2025-02-01 PROCEDURE — 84443 ASSAY THYROID STIM HORMONE: CPT

## 2025-02-01 PROCEDURE — 80053 COMPREHEN METABOLIC PANEL: CPT

## 2025-02-01 PROCEDURE — 82306 VITAMIN D 25 HYDROXY: CPT

## 2025-02-04 ENCOUNTER — PATIENT MESSAGE (OUTPATIENT)
Dept: SURGERY | Facility: CLINIC | Age: 30
End: 2025-02-04

## 2025-02-04 RX ORDER — ERGOCALCIFEROL 1.25 MG/1
50000 CAPSULE, LIQUID FILLED ORAL WEEKLY
Qty: 12 CAPSULE | Refills: 0 | Status: SHIPPED | OUTPATIENT
Start: 2025-02-04 | End: 2025-04-23

## 2025-02-21 DIAGNOSIS — R73.03 PREDIABETES: ICD-10-CM

## 2025-02-21 DIAGNOSIS — E66.01 OBESITY, MORBID, BMI 40.0-49.9 (HCC): ICD-10-CM

## 2025-02-21 DIAGNOSIS — E78.2 MIXED HYPERLIPIDEMIA: ICD-10-CM

## 2025-02-21 RX ORDER — TIRZEPATIDE 10 MG/.5ML
10 INJECTION, SOLUTION SUBCUTANEOUS WEEKLY
Qty: 2 ML | Refills: 0 | Status: SHIPPED | OUTPATIENT
Start: 2025-02-21 | End: 2025-03-15

## 2025-03-10 ENCOUNTER — OFFICE VISIT (OUTPATIENT)
Dept: SURGERY | Facility: CLINIC | Age: 30
End: 2025-03-10
Payer: COMMERCIAL

## 2025-03-10 VITALS
HEIGHT: 70.3 IN | SYSTOLIC BLOOD PRESSURE: 130 MMHG | DIASTOLIC BLOOD PRESSURE: 87 MMHG | WEIGHT: 277 LBS | HEART RATE: 78 BPM | BODY MASS INDEX: 39.21 KG/M2 | OXYGEN SATURATION: 99 %

## 2025-03-10 DIAGNOSIS — E66.01 OBESITY, MORBID, BMI 40.0-49.9 (HCC): ICD-10-CM

## 2025-03-10 DIAGNOSIS — E55.9 VITAMIN D DEFICIENCY: ICD-10-CM

## 2025-03-10 DIAGNOSIS — R73.03 PREDIABETES: Primary | ICD-10-CM

## 2025-03-10 DIAGNOSIS — E78.2 MIXED HYPERLIPIDEMIA: ICD-10-CM

## 2025-03-10 PROCEDURE — 99214 OFFICE O/P EST MOD 30 MIN: CPT | Performed by: INTERNAL MEDICINE

## 2025-03-10 RX ORDER — TIRZEPATIDE 10 MG/.5ML
10 INJECTION, SOLUTION SUBCUTANEOUS WEEKLY
Qty: 2 ML | Refills: 1 | Status: SHIPPED | OUTPATIENT
Start: 2025-03-10 | End: 2025-04-01

## 2025-03-10 NOTE — PROGRESS NOTES
CC:   Chief Complaint   Patient presents with    Follow - Up    Weight Management        Referring Physician to whom this note will be reported back to: AJAY FLOWER   Reason for medical consultation: Medical Management of Weight and Weight related comorbidities.      HPI:       Initial weight: 290 lbs 1/2025  Current weight: 277 lbs  Interval weight loss: 13 lbs  Total weight loss: 13 lbs      Body mass index is 39.41 kg/m².   Wt Readings from Last 6 Encounters:   03/10/25 277 lb (125.6 kg)   01/27/25 290 lb 12.8 oz (131.9 kg)   09/05/23 (!) 317 lb 1.6 oz (143.8 kg)   09/04/23 (!) 321 lb (145.6 kg)   08/09/22 (!) 352 lb 12.8 oz (160 kg)   07/28/22 (!) 343 lb (155.6 kg)     /87   Pulse 78   Ht 5' 10.3\" (1.786 m)   Wt 277 lb (125.6 kg)   SpO2 99%   BMI 39.41 kg/m²   Vitals:    03/10/25 1400   BP: 130/87   Pulse: 78     Body mass index is 39.41 kg/m².          Typical Dietary Intake:  Breakfast AM Snack Lunch PM Snack Dinner   Bagel with egg skip salad skip chicken     Soda Drinker?: No   Number of restaurant or fast food meals/week:  0 meals/week    LABS and RESULTS:     No visits with results within 6 Month(s) from this visit.   Latest known visit with results is:   Admission on 09/05/2023, Discharged on 09/06/2023   Component Date Value    Glucose 09/06/2023 90     Sodium 09/06/2023 141     Potassium 09/06/2023 4.2     Chloride 09/06/2023 114 (H)     CO2 09/06/2023 24.0     Anion Gap 09/06/2023 3     BUN 09/06/2023 12     Creatinine 09/06/2023 0.97     BUN/CREA Ratio 09/06/2023 12.4     Calcium, Total 09/06/2023 8.8     Calculated Osmolality 09/06/2023 291     eGFR-Cr 09/06/2023 81     Potassium 09/06/2023 4.2     WBC 09/06/2023 4.7     RBC 09/06/2023 4.31     HGB 09/06/2023 11.7 (L)     HCT 09/06/2023 36.9     MCV 09/06/2023 85.6     MCH 09/06/2023 27.1     MCHC 09/06/2023 31.7     RDW-SD 09/06/2023 43.0     RDW 09/06/2023 13.6     PLT 09/06/2023 233.0     Neutrophil Absolute Prel* 09/06/2023 2.59      Neutrophil Absolute 2023 2.59     Lymphocyte Absolute 2023 1.64     Monocyte Absolute 2023 0.32     Eosinophil Absolute 2023 0.10     Basophil Absolute 2023 0.02     Immature Granulocyte Abs* 2023 0.00     Neutrophil % 2023 55.5     Lymphocyte % 2023 35.1     Monocyte % 2023 6.9     Eosinophil % 2023 2.1     Basophil % 2023 0.4     Immature Granulocyte % 2023 0.0        Current Outpatient Medications   Medication Sig Dispense Refill    Tirzepatide-Weight Management (ZEPBOUND) 10 MG/0.5ML Subcutaneous Solution Auto-injector Inject 10 mg into the skin once a week for 4 doses. 2 mL 0    Tirzepatide-Weight Management (ZEPBOUND) 10 MG/0.5ML Subcutaneous Solution Auto-injector Inject 10 mg into the skin once a week for 4 doses. 2 mL 0    ergocalciferol 1.25 MG (00270 UT) Oral Cap Take 1 capsule (50,000 Units total) by mouth once a week for 12 doses. 12 capsule 0      Past Medical History:    Amenorrhea    need to be replaced again    Kidney stone    Preeclampsia, third trimester (HCC)       Past Surgical History:   Procedure Laterality Date    Appendectomy  2016     delivery only  2015    Tonsillectomy  2012       Social History:  Social History     Socioeconomic History    Marital status: Single   Tobacco Use    Smoking status: Never    Smokeless tobacco: Never   Vaping Use    Vaping status: Never Used   Substance and Sexual Activity    Alcohol use: Never    Drug use: Never     Family History:  Family History   Problem Relation Age of Onset    Hypertension Mother     Heart Disease Father         40s          REVIEW OF SYSTEMS:   10 point review of systems otherwise negative.  With the exception of HPI and assessment and plan        EXAM:     GENERAL: NAD, conversant  SKIN: good turgor, no jaundice  HEENT: nl external nose and ears  NECK: supple  LUNGS: nl effort, clear to auscultation bilaterally  CARDIO: RRR, no murmur  ABDOMEN: NT/ND, no  masses  EXTREMITIES: gait normal, no edema   PSYCH: Oriented times three, appropriate affect    ASSESSMENT AND PLAN:     1. Prediabetes  2. Mixed hyperlipidemia  3. Vitamin D deficiency  4. Obesity, morbid, BMI 40.0-49.9 ([facility])    - Initial weight 290 lb 12.8 oz (131.9 kg), Initial Body mass index is 41.37 kg/m².  -  lbs 11/2023 - 7/2024 LW  282 lb wegovy 2.4 mg, some nausea and manageable  -Tried phentermine, caused elevation in BP, stopped  - The patient participated in a comprehensive weight management program that encourages behavioral modification, reduced calorie diet and increased physical activity with continuing follow up for at least 6 months prior to using drug therapy.     Plan:    - Tirzepatide-Weight Management (ZEPBOUND) 10 MG/0.5ML Subcutaneous Solution Auto-injector; Inject 10 mg into the skin once a week for 4 doses.  Dispense: 2 mL; Refill: 1    Regarding Obesity:  Follow up with dietitian and psychologist as recommended.  Discussed the role of sleep and stress in weight management.  Labs orders as above.  Counseled on comprehensive weight loss plan including attention to nutrition, exercise and behavior/stress management for success. See patient instruction below for more details.  Weight Loss Consent to treat reviewed and signed.    Regarding weight loss Medications.  Medication use and side effects reviewed with patient.    Medication will be used with a reduced calorie diet and increased physical activity in the management of exogenous obesity.  Patient will be responsible to let me know of any side effects or complications with medications.             No follow-ups on file.     Bianca Layton MD

## 2025-03-24 RX ORDER — TIRZEPATIDE 10 MG/.5ML
INJECTION, SOLUTION SUBCUTANEOUS
Qty: 2 ML | Refills: 1 | OUTPATIENT
Start: 2025-03-24

## 2025-05-09 ENCOUNTER — OFFICE VISIT (OUTPATIENT)
Dept: SURGERY | Facility: CLINIC | Age: 30
End: 2025-05-09
Payer: COMMERCIAL

## 2025-05-09 ENCOUNTER — LAB ENCOUNTER (OUTPATIENT)
Dept: LAB | Age: 30
End: 2025-05-09
Attending: INTERNAL MEDICINE
Payer: COMMERCIAL

## 2025-05-09 VITALS
WEIGHT: 272 LBS | HEART RATE: 71 BPM | OXYGEN SATURATION: 98 % | SYSTOLIC BLOOD PRESSURE: 130 MMHG | HEIGHT: 70.3 IN | DIASTOLIC BLOOD PRESSURE: 78 MMHG | BODY MASS INDEX: 38.51 KG/M2

## 2025-05-09 DIAGNOSIS — E78.2 MIXED HYPERLIPIDEMIA: ICD-10-CM

## 2025-05-09 DIAGNOSIS — E55.9 VITAMIN D DEFICIENCY: ICD-10-CM

## 2025-05-09 DIAGNOSIS — Z51.81 THERAPEUTIC DRUG MONITORING: ICD-10-CM

## 2025-05-09 DIAGNOSIS — E66.01 OBESITY, MORBID, BMI 40.0-49.9 (HCC): ICD-10-CM

## 2025-05-09 DIAGNOSIS — R73.03 PREDIABETES: Primary | ICD-10-CM

## 2025-05-09 LAB — VIT D+METAB SERPL-MCNC: 53.9 NG/ML (ref 30–100)

## 2025-05-09 PROCEDURE — 82306 VITAMIN D 25 HYDROXY: CPT

## 2025-05-09 PROCEDURE — 99214 OFFICE O/P EST MOD 30 MIN: CPT | Performed by: INTERNAL MEDICINE

## 2025-05-09 PROCEDURE — 36415 COLL VENOUS BLD VENIPUNCTURE: CPT

## 2025-05-09 RX ORDER — TIRZEPATIDE 12.5 MG/.5ML
12.5 INJECTION, SOLUTION SUBCUTANEOUS WEEKLY
Qty: 2 ML | Refills: 2 | Status: SHIPPED | OUTPATIENT
Start: 2025-05-09 | End: 2025-05-31

## 2025-05-09 RX ORDER — TIRZEPATIDE 10 MG/.5ML
INJECTION, SOLUTION SUBCUTANEOUS
COMMUNITY
Start: 2025-04-23 | End: 2025-05-09

## 2025-05-09 NOTE — PROGRESS NOTES
CC:   Chief Complaint   Patient presents with    Follow - Up    Weight Management        Referring Physician to whom this note will be reported back to: AJAY FLOWER   Reason for medical consultation: Medical Management of Weight and Weight related comorbidities.      HPI:       Initial weight: 290 lbs 1/2025  Current weight: 272 lbs  Interval weight loss: 5 lbs  Total weight loss: 18 lbs      Body mass index is 38.7 kg/m².   Wt Readings from Last 6 Encounters:   05/09/25 272 lb (123.4 kg)   03/10/25 277 lb (125.6 kg)   01/27/25 290 lb 12.8 oz (131.9 kg)   09/05/23 (!) 317 lb 1.6 oz (143.8 kg)   09/04/23 (!) 321 lb (145.6 kg)   08/09/22 (!) 352 lb 12.8 oz (160 kg)     /78   Pulse 71   Ht 5' 10.3\" (1.786 m)   Wt 272 lb (123.4 kg)   SpO2 98%   BMI 38.70 kg/m²   Vitals:    05/09/25 1119   BP: 130/78   Pulse: 71     Body mass index is 38.7 kg/m².          Typical Dietary Intake:  Breakfast AM Snack Lunch PM Snack Dinner   Bagel with egg skip salad skip chicken     Soda Drinker?: No   Number of restaurant or fast food meals/week:  0 meals/week    LABS and RESULTS:     No visits with results within 6 Month(s) from this visit.   Latest known visit with results is:   Admission on 09/05/2023, Discharged on 09/06/2023   Component Date Value    Glucose 09/06/2023 90     Sodium 09/06/2023 141     Potassium 09/06/2023 4.2     Chloride 09/06/2023 114 (H)     CO2 09/06/2023 24.0     Anion Gap 09/06/2023 3     BUN 09/06/2023 12     Creatinine 09/06/2023 0.97     BUN/CREA Ratio 09/06/2023 12.4     Calcium, Total 09/06/2023 8.8     Calculated Osmolality 09/06/2023 291     eGFR-Cr 09/06/2023 81     Potassium 09/06/2023 4.2     WBC 09/06/2023 4.7     RBC 09/06/2023 4.31     HGB 09/06/2023 11.7 (L)     HCT 09/06/2023 36.9     MCV 09/06/2023 85.6     MCH 09/06/2023 27.1     MCHC 09/06/2023 31.7     RDW-SD 09/06/2023 43.0     RDW 09/06/2023 13.6     PLT 09/06/2023 233.0     Neutrophil Absolute Prel* 09/06/2023 2.59      Neutrophil Absolute 2023 2.59     Lymphocyte Absolute 2023 1.64     Monocyte Absolute 2023 0.32     Eosinophil Absolute 2023 0.10     Basophil Absolute 2023 0.02     Immature Granulocyte Abs* 2023 0.00     Neutrophil % 2023 55.5     Lymphocyte % 2023 35.1     Monocyte % 2023 6.9     Eosinophil % 2023 2.1     Basophil % 2023 0.4     Immature Granulocyte % 2023 0.0        Current Outpatient Medications   Medication Sig Dispense Refill    Tirzepatide-Weight Management (ZEPBOUND) 12.5 MG/0.5ML Subcutaneous Solution Auto-injector Inject 12.5 mg into the skin once a week for 4 doses. 2 mL 2      Past Medical History:    Amenorrhea    need to be replaced again    Kidney stone    Preeclampsia, third trimester ([facility])       Past Surgical History:   Procedure Laterality Date    Appendectomy  2016     delivery only  2015    Tonsillectomy  2012       Social History:  Social History     Socioeconomic History    Marital status: Single   Tobacco Use    Smoking status: Never    Smokeless tobacco: Never   Vaping Use    Vaping status: Never Used   Substance and Sexual Activity    Alcohol use: Never    Drug use: Never     Family History:  Family History   Problem Relation Age of Onset    Hypertension Mother     Heart Disease Father         40s          REVIEW OF SYSTEMS:   10 point review of systems otherwise negative.  With the exception of HPI and assessment and plan    EXAM:     GENERAL: NAD, conversant  SKIN: good turgor, no jaundice  HEENT: nl external nose and ears  NECK: supple  LUNGS: nl effort, clear to auscultation bilaterally  CARDIO: RRR, no murmur  ABDOMEN: NT/ND, no masses  EXTREMITIES: gait normal, no edema   PSYCH: Oriented times three, appropriate affect    ASSESSMENT AND PLAN:     1. Prediabetes  2. Mixed hyperlipidemia  3. Vitamin D deficiency  4. Obesity, morbid, BMI 40.0-49.9 ([facility])    - Initial weight 290 lb 12.8 oz (131.9 kg), Initial  Body mass index is 41.37 kg/m².  - Wegovy 2.4 mg,  lbs 11/2023 - 7/2024 LW  282 lb  some nausea and manageable  -Tried phentermine, caused elevation in BP, stopped  - The patient participated in a comprehensive weight management program that encourages behavioral modification, reduced calorie diet and increased physical activity with continuing follow up for at least 6 months prior to using drug therapy.     Plan:    - Tirzepatide-Weight Management (ZEPBOUND) 12.5 MG/0.5ML Subcutaneous Solution Auto-injector;  - zepbound 10 mg started 1/2025-5/2025    Regarding Obesity:  Follow up with dietitian and psychologist as recommended.  Discussed the role of sleep and stress in weight management.  Labs orders as above.  Counseled on comprehensive weight loss plan including attention to nutrition, exercise and behavior/stress management for success. See patient instruction below for more details.  Weight Loss Consent to treat reviewed and signed.    Regarding weight loss Medications.  Medication use and side effects reviewed with patient.    Medication will be used with a reduced calorie diet and increased physical activity in the management of exogenous obesity.  Patient will be responsible to let me know of any side effects or complications with medications.             Return in about 3 months (around 8/9/2025).     Bianca Layton MD

## 2025-07-21 ENCOUNTER — PATIENT MESSAGE (OUTPATIENT)
Dept: SURGERY | Facility: CLINIC | Age: 30
End: 2025-07-21

## (undated) NOTE — ED AVS SNAPSHOT
Cleo Stoddard   MRN: [id number]    Department:  [facility] Emergency Department   Date of Visit:  9/25/2019           Disclosure     Insurance plans vary and the physician(s) referred by the ER may not be covered by your plan.  Please contact CARE PHYSICIAN AT ONCE OR RETURN IMMEDIATELY TO THE EMERGENCY DEPARTMENT. If you have been prescribed any medication(s), please fill your prescription right away and begin taking the medication(s) as directed.   If you believe that any of the medications